# Patient Record
Sex: MALE | Race: BLACK OR AFRICAN AMERICAN | URBAN - METROPOLITAN AREA
[De-identification: names, ages, dates, MRNs, and addresses within clinical notes are randomized per-mention and may not be internally consistent; named-entity substitution may affect disease eponyms.]

---

## 2020-11-24 ENCOUNTER — OFFICE VISIT (OUTPATIENT)
Dept: URBAN - METROPOLITAN AREA CLINIC 40 | Facility: CLINIC | Age: 60
End: 2020-11-24
Payer: COMMERCIAL

## 2020-11-24 ENCOUNTER — WEB ENCOUNTER (OUTPATIENT)
Dept: URBAN - METROPOLITAN AREA CLINIC 40 | Facility: CLINIC | Age: 60
End: 2020-11-24

## 2020-11-24 ENCOUNTER — LAB OUTSIDE AN ENCOUNTER (OUTPATIENT)
Dept: URBAN - METROPOLITAN AREA CLINIC 40 | Facility: CLINIC | Age: 60
End: 2020-11-24

## 2020-11-24 DIAGNOSIS — K64.8 HEMORRHOIDS, INTERNAL, WITH BLEEDING: ICD-10-CM

## 2020-11-24 DIAGNOSIS — Z86.010 HISTORY OF COLON POLYPS: ICD-10-CM

## 2020-11-24 PROCEDURE — G8427 DOCREV CUR MEDS BY ELIG CLIN: HCPCS | Performed by: INTERNAL MEDICINE

## 2020-11-24 PROCEDURE — 3017F COLORECTAL CA SCREEN DOC REV: CPT | Performed by: INTERNAL MEDICINE

## 2020-11-24 PROCEDURE — G8482 FLU IMMUNIZE ORDER/ADMIN: HCPCS | Performed by: INTERNAL MEDICINE

## 2020-11-24 PROCEDURE — G8417 CALC BMI ABV UP PARAM F/U: HCPCS | Performed by: INTERNAL MEDICINE

## 2020-11-24 PROCEDURE — 99203 OFFICE O/P NEW LOW 30 MIN: CPT | Performed by: INTERNAL MEDICINE

## 2020-11-24 PROCEDURE — 1036F TOBACCO NON-USER: CPT | Performed by: INTERNAL MEDICINE

## 2020-11-24 RX ORDER — SODIUM, POTASSIUM,MAG SULFATES 17.5-3.13G
17.5-13.3-1.6 GM/177ML SOLUTION, RECONSTITUTED, ORAL ORAL
Qty: 354 MILLILITER | Refills: 0 | OUTPATIENT
Start: 2020-11-24 | End: 2020-11-25

## 2020-11-24 NOTE — HPI-TODAY'S VISIT:
Mr. Barrera is a 60-year-old black male seen in consultation requested Dr. Hiram Brock for colon cancer screening.  Patient had a colonoscopy 5 years ago where polyps were removed.  He is moving his bowels daily.  He has had some intermittent rectal bleeding.  Has been told that he has internal hemorrhoids in the past.  Last episode of bleeding was approximately 2 months ago.  The stool as well as the tissue paper has small streaks of bright red blood.  He denies any abdominal pain or nausea.  He does admit occasionally taking NSAIDs in the form of BC powders.  Notes  some intermittent  perianal discomfort as well.  No family history of colon cancer or polyps.

## 2020-11-24 NOTE — PHYSICAL EXAM CONSTITUTIONAL:
Overweight BM well developed, well nourished , in no acute distress , ambulating without difficulty , normal communication ability

## 2021-01-08 ENCOUNTER — OFFICE VISIT (OUTPATIENT)
Dept: URBAN - METROPOLITAN AREA SURGERY CENTER 30 | Facility: SURGERY CENTER | Age: 61
End: 2021-01-08
Payer: COMMERCIAL

## 2021-01-08 DIAGNOSIS — Z86.010 H/O ADENOMATOUS POLYP OF COLON: ICD-10-CM

## 2021-01-08 DIAGNOSIS — K63.89 BACTERIAL OVERGROWTH SYNDROME: ICD-10-CM

## 2021-01-08 PROCEDURE — 45380 COLONOSCOPY AND BIOPSY: CPT | Performed by: INTERNAL MEDICINE

## 2021-01-08 PROCEDURE — G8907 PT DOC NO EVENTS ON DISCHARG: HCPCS | Performed by: INTERNAL MEDICINE

## 2021-01-08 PROCEDURE — G9936 PMH PLYP/NEO CO/RECT/JUN/ANS: HCPCS | Performed by: INTERNAL MEDICINE

## 2021-01-19 ENCOUNTER — OFFICE VISIT (OUTPATIENT)
Dept: URBAN - METROPOLITAN AREA CLINIC 40 | Facility: CLINIC | Age: 61
End: 2021-01-19
Payer: COMMERCIAL

## 2021-01-19 DIAGNOSIS — K64.8 HEMORRHOIDS, INTERNAL, WITH BLEEDING: ICD-10-CM

## 2021-01-19 DIAGNOSIS — Z86.010 HISTORY OF COLON POLYPS: ICD-10-CM

## 2021-01-19 DIAGNOSIS — K57.30 DIVERTICULA OF COLON: ICD-10-CM

## 2021-01-19 PROBLEM — 428283002 HISTORY OF POLYP OF COLON: Status: ACTIVE | Noted: 2020-11-24

## 2021-01-19 PROBLEM — 398050005 DIVERTICULAR DISEASE OF COLON: Status: ACTIVE | Noted: 2021-01-19

## 2021-01-19 PROCEDURE — G9903 PT SCRN TBCO ID AS NON USER: HCPCS | Performed by: INTERNAL MEDICINE

## 2021-01-19 PROCEDURE — G8427 DOCREV CUR MEDS BY ELIG CLIN: HCPCS | Performed by: INTERNAL MEDICINE

## 2021-01-19 PROCEDURE — G8482 FLU IMMUNIZE ORDER/ADMIN: HCPCS | Performed by: INTERNAL MEDICINE

## 2021-01-19 PROCEDURE — G8417 CALC BMI ABV UP PARAM F/U: HCPCS | Performed by: INTERNAL MEDICINE

## 2021-01-19 PROCEDURE — 99213 OFFICE O/P EST LOW 20 MIN: CPT | Performed by: INTERNAL MEDICINE

## 2021-01-19 PROCEDURE — 3017F COLORECTAL CA SCREEN DOC REV: CPT | Performed by: INTERNAL MEDICINE

## 2021-01-19 NOTE — PHYSICAL EXAM LYMPHATIC:
Neck , no lymphadenopathy [FreeTextEntry3] : Here for subacute dry cough. Not improved with allergy meds.\par Will switch ACEI to ARB.\par RTC in 5 weeks for BP check and f/u symptoms.

## 2021-01-19 NOTE — HPI-TODAY'S VISIT:
Mr. Barrera presents to clinic for follow-up.  He was seen in November complaining of rectal bleeding from his hemorrhoids as well as being in need of a surveillance colonoscopy due to a prior history of colon polyps.  Colonoscopy was completed on January 8.  This was completed to the terminal ileum.  No hepatic flexure polyp was noted endoscopically.  In the microscope it actually ended up being a lymphoid aggregate.  He also had left-sided diverticulosis and internal hemorrhoids.  Patient states he is done well after procedure.  He is not had further issues with bleeding with the hemorrhoids. Mr. Barrera presents to clinic for follow-up.  He was seen in November complaining of rectal bleeding from his hemorrhoids as well as being in need of a surveillance colonoscopy due to a prior history of colon polyps.  Colonoscopy was completed on January 8.  This was completed to the terminal ileum.  No hepatic flexure polyp was noted endoscopically.  In the microscope it actually ended up being a lymphoid aggregate.  He also had left-sided diverticulosis and internal hemorrhoids.  Patient states he is done well after procedure.  He is not had further issues with bleeding with the hemorrhoids.

## 2021-03-10 ENCOUNTER — OFFICE VISIT (OUTPATIENT)
Dept: URBAN - METROPOLITAN AREA CLINIC 40 | Facility: CLINIC | Age: 61
End: 2021-03-10
Payer: COMMERCIAL

## 2021-03-10 DIAGNOSIS — K64.9 HEMORRHOIDS WITHOUT COMPLICATION: ICD-10-CM

## 2021-03-10 PROCEDURE — 46221 LIGATION OF HEMORRHOID(S): CPT | Performed by: INTERNAL MEDICINE

## 2021-03-10 RX ORDER — LISINOPRIL 20 MG/1
TABLET ORAL
Qty: 90 DELAYED RELEASE TABLET | Refills: 1 | Status: ACTIVE | COMMUNITY

## 2021-03-10 RX ORDER — ALBUTEROL SULFATE 90 UG/1
AEROSOL, METERED RESPIRATORY (INHALATION)
Qty: 18 G | Refills: 3 | Status: ACTIVE | COMMUNITY

## 2021-03-10 NOTE — HPI-TODAY'S VISIT:
Mr. Barrera presents to clinic to initiate hemorrhoid banding.  He was last seen in January after he had his colonoscopy.  He had a history of colon polyps but  his January  colonoscopy only showed left-sided diverticulosis and internal hemorrhoids.  He is presenting to start the banding process due to his external hemorrhoids causing itching and discomfort.

## 2021-04-12 ENCOUNTER — OFFICE VISIT (OUTPATIENT)
Dept: URBAN - METROPOLITAN AREA CLINIC 40 | Facility: CLINIC | Age: 61
End: 2021-04-12
Payer: COMMERCIAL

## 2021-04-12 VITALS
SYSTOLIC BLOOD PRESSURE: 136 MMHG | HEIGHT: 69 IN | BODY MASS INDEX: 31.49 KG/M2 | OXYGEN SATURATION: 95 % | DIASTOLIC BLOOD PRESSURE: 74 MMHG | HEART RATE: 104 BPM | WEIGHT: 212.6 LBS | TEMPERATURE: 98.3 F

## 2021-04-12 DIAGNOSIS — K64.1 GRADE II HEMORRHOIDS: ICD-10-CM

## 2021-04-12 DIAGNOSIS — K64.9 HEMORRHOIDS WITHOUT COMPLICATION: ICD-10-CM

## 2021-04-12 PROCEDURE — 46221 LIGATION OF HEMORRHOID(S): CPT | Performed by: INTERNAL MEDICINE

## 2021-04-12 RX ORDER — LISINOPRIL 20 MG/1
TABLET ORAL
Qty: 90 DELAYED RELEASE TABLET | Refills: 1 | Status: ACTIVE | COMMUNITY

## 2021-04-12 RX ORDER — ALBUTEROL SULFATE 90 UG/1
AEROSOL, METERED RESPIRATORY (INHALATION)
Qty: 18 G | Refills: 3 | Status: ACTIVE | COMMUNITY

## 2021-04-12 NOTE — HPI-TODAY'S VISIT:
Mr. Barrera presents to clinic for his second hemorrhoid band.  Recall we saw him on March 10 where we banded the left lateral hemorrhoid.  Patient has not had any issues since his first band.  States he is trying to keep the stools soft without straining.  Recall colonoscopy was performed on January 8, 2021 to the terminal ileum.  He had one small polyp in the hepatic flexure that ended up being a lymphoid aggregate.  He also was noted to have left-sided diverticulosis and internal hemorrhoids.

## 2021-05-05 ENCOUNTER — OFFICE VISIT (OUTPATIENT)
Dept: URBAN - METROPOLITAN AREA CLINIC 40 | Facility: CLINIC | Age: 61
End: 2021-05-05
Payer: COMMERCIAL

## 2021-05-05 DIAGNOSIS — K64.9 HEMORRHOIDS WITHOUT COMPLICATION: ICD-10-CM

## 2021-05-05 PROCEDURE — 46221 LIGATION OF HEMORRHOID(S): CPT | Performed by: INTERNAL MEDICINE

## 2021-05-05 RX ORDER — ALBUTEROL SULFATE 90 UG/1
AEROSOL, METERED RESPIRATORY (INHALATION)
Qty: 18 G | Refills: 3 | Status: ACTIVE | COMMUNITY

## 2021-05-05 RX ORDER — LISINOPRIL 20 MG/1
TABLET ORAL
Qty: 90 DELAYED RELEASE TABLET | Refills: 1 | Status: ACTIVE | COMMUNITY

## 2021-05-05 NOTE — HPI-TODAY'S VISIT:
Mr. Barrera presents  to clinic for his final hemorrhoid band.  He had his right posterior hemorrhoid banded on April 12.  He had his left lateral hemorrhoid banding in March.  Patient's had no further symptoms from his hemorrhoids.  Recall his colonoscopy in January 2021 showed just left-sided diverticulosis and internal hemorrhoids.

## 2023-12-19 ENCOUNTER — OFFICE VISIT (OUTPATIENT)
Dept: URBAN - METROPOLITAN AREA CLINIC 40 | Facility: CLINIC | Age: 63
End: 2023-12-19
Payer: COMMERCIAL

## 2023-12-19 ENCOUNTER — DASHBOARD ENCOUNTERS (OUTPATIENT)
Age: 63
End: 2023-12-19

## 2023-12-19 VITALS
HEART RATE: 92 BPM | WEIGHT: 206 LBS | HEIGHT: 69 IN | SYSTOLIC BLOOD PRESSURE: 130 MMHG | DIASTOLIC BLOOD PRESSURE: 86 MMHG | BODY MASS INDEX: 30.51 KG/M2

## 2023-12-19 DIAGNOSIS — Z86.010 HISTORY OF COLON POLYPS: ICD-10-CM

## 2023-12-19 DIAGNOSIS — K64.9 HEMORRHOIDS WITHOUT COMPLICATION: ICD-10-CM

## 2023-12-19 PROCEDURE — 99214 OFFICE O/P EST MOD 30 MIN: CPT | Performed by: INTERNAL MEDICINE

## 2023-12-19 RX ORDER — LISINOPRIL 20 MG/1
TABLET ORAL
Qty: 90 DELAYED RELEASE TABLET | Refills: 1 | Status: DISCONTINUED | COMMUNITY

## 2023-12-19 RX ORDER — ALBUTEROL SULFATE 90 UG/1
AEROSOL, METERED RESPIRATORY (INHALATION)
Qty: 18 G | Refills: 3 | Status: ACTIVE | COMMUNITY

## 2023-12-19 NOTE — HPI-TODAY'S VISIT:
Mr. Barrera presents to clinic for follow-up.  He was last seen in the office in May 2021.  Recall he had his hemorrhoids banded.  Patient states his primary care physician advised him that he was due for surveillance.  He does have a history of colon polyps.  However, his last colonoscopy was January 2021.  This was complete to the ileum.  He had 1 polypoid lesion in the hepatic flexure that actually ended up being a lymphoid aggregate.  He also had diverticular and hemorrhoidal disease.  Patient states that he is moving his bowels regularly.  Has had no further problems with the hemorrhoids.  He did have a transient episode of bleeding from his rectum but this was after a transrectal prostate biopsy.  He does note occasionally feels like his heart is racing when he is having a bowel movement.  He denies any straining during this time.  He denies any abdominal pain during this time.

## 2025-02-19 ENCOUNTER — OFFICE VISIT (OUTPATIENT)
Dept: URBAN - METROPOLITAN AREA CLINIC 40 | Facility: CLINIC | Age: 65
End: 2025-02-19
Payer: COMMERCIAL

## 2025-02-19 VITALS
TEMPERATURE: 97.1 F | BODY MASS INDEX: 31.49 KG/M2 | SYSTOLIC BLOOD PRESSURE: 132 MMHG | DIASTOLIC BLOOD PRESSURE: 84 MMHG | HEIGHT: 69 IN | HEART RATE: 89 BPM | WEIGHT: 212.6 LBS

## 2025-02-19 DIAGNOSIS — K92.1 RECTAL BLEEDING: ICD-10-CM

## 2025-02-19 DIAGNOSIS — K76.0 FATTY INFILTRATION OF LIVER: ICD-10-CM

## 2025-02-19 DIAGNOSIS — Z86.0100 PERSONAL HISTORY OF COLON POLYPS, UNSPECIFIED: ICD-10-CM

## 2025-02-19 PROCEDURE — 99214 OFFICE O/P EST MOD 30 MIN: CPT | Performed by: INTERNAL MEDICINE

## 2025-02-19 RX ORDER — ALBUTEROL SULFATE 90 UG/1
AEROSOL, METERED RESPIRATORY (INHALATION)
Qty: 18 G | Refills: 3 | Status: ACTIVE | COMMUNITY

## 2025-02-19 NOTE — HPI-TODAY'S VISIT:
Mr. Barrera presents to clinic for follow-up.  He was last seen in the office in 2023.  Recall patient has history of hemorrhoids that were banded in 2021.  He has a history of colon polyps.  Last colonoscopy was 2021 where only a lymphoid aggregate was found as well as diverticular and hemorrhoidal disease.  When he was seen in 2023 he was having some bleeding but this was after prostate biopsy.  Patient presents now because he is had some issues with rectal bleeding.  He states this started in December.  He noted bright red blood with tissue paper as well as the stool intermittently.  He is moving his bowels daily without any straining.  He has had occasional left sided abdominal pain.  He admits around the time this started he was taking a large amount of anti-inflammatories in the form of ibuprofen and meloxicam due to arthritis in his hip.  Patient had imaging and August 2024 with a CT scan.  This showed fatty liver as well as a left inguinal hernia.

## 2025-05-14 ENCOUNTER — OFFICE VISIT (OUTPATIENT)
Dept: URBAN - METROPOLITAN AREA CLINIC 40 | Facility: CLINIC | Age: 65
End: 2025-05-14
Payer: COMMERCIAL

## 2025-05-14 DIAGNOSIS — K92.1 RECTAL BLEEDING: ICD-10-CM

## 2025-05-14 DIAGNOSIS — K76.0 FATTY INFILTRATION OF LIVER: ICD-10-CM

## 2025-05-14 DIAGNOSIS — K21.9 GERD: ICD-10-CM

## 2025-05-14 PROCEDURE — 99214 OFFICE O/P EST MOD 30 MIN: CPT | Performed by: INTERNAL MEDICINE

## 2025-05-14 RX ORDER — ALBUTEROL SULFATE 90 UG/1
AEROSOL, METERED RESPIRATORY (INHALATION)
Qty: 18 G | Refills: 3 | Status: ACTIVE | COMMUNITY

## 2025-05-14 NOTE — HPI-TODAY'S VISIT:
Mr. Barrera presents to clinic for follow-up.  He was seen in February complaining of issues with rectal bleeding.  Recall patient had hemorrhoid banding in 2021.  Last colonoscopy was that year as well with diverticular and hemorrhoidal disease.  Patient had been taking a large amount of NSAIDs at this time for his arthritis.  He was advised to stop that and change his eating habits as he was having some mild constipation.  If bleeding persisted we would discuss doing his colonoscopy this year as opposed to next when he is due for surveillance because of a history of polyps.  Patient states he send no further rectal bleeding since I saw him last.  He is having a bowel movement that is soft daily since changing his diet.  Denies any abdominal pain.  Denies any nausea or vomiting.  He has occasional reflux if he eats late at night.  He states that he is having issues with his arthritis seeing that he has not been able to take NSAIDs.  He is taking Osteo Bi-Flex currently.

## 2025-05-16 LAB
ALBUMIN/GLOBULIN RATIO: 1.5
ALBUMIN: 4.2
ALKALINE PHOSPHATASE: 55
ALT (SGPT): 54
AST (SGOT): 33
BILIRUBIN, DIRECT: 0.1
BILIRUBIN, INDIRECT: 0.4
BILIRUBIN, TOTAL: 0.5
ENHANCED LIVER FIBROSIS (ELF) SCORE: 9.29
GLOBULIN: 2.8
PROTEIN, TOTAL: 7